# Patient Record
Sex: FEMALE | URBAN - METROPOLITAN AREA
[De-identification: names, ages, dates, MRNs, and addresses within clinical notes are randomized per-mention and may not be internally consistent; named-entity substitution may affect disease eponyms.]

---

## 2024-06-28 ENCOUNTER — HOSPITAL ENCOUNTER (OUTPATIENT)
Dept: TELEMEDICINE | Facility: HOSPITAL | Age: 74
Discharge: HOME | End: 2024-06-28

## 2024-06-28 DIAGNOSIS — R47.81 SLURRED SPEECH: Primary | ICD-10-CM

## 2024-06-28 PROCEDURE — G0425 INPT/ED TELECONSULT30: HCPCS | Mod: GT,,, | Performed by: PSYCHIATRY & NEUROLOGY

## 2024-06-28 NOTE — SUBJECTIVE & OBJECTIVE
HPI:  74 y.o. female with HTN, PAD s/p stenting, brought in due to acute onset L face droop and slurred speech. Never had similar symptoms before.  She was reportedly taking to a friend and was noted to have the aforementioned symptoms.  Improving.      Images personally reviewed and interpreted:  Study: Head CT  Study Interpretation: no acute abnormality     Assessment and plan:  Concern for small acute L cortical infarct.  She seems to have some difficulty with comprehension and motor language output but overall symptoms are mild at this moment, thus will not treat with TN.  Recommended CTA brain and neck now. If no LVO noted, admit to [facility] facility and obtain MRI brain.  DAPT.  Neurology, speech therapy consults.    Lytics recommendation: Thrombolytic therapy not recommended due to Mild Non-Disabling Symptoms  Thrombectomy recommendation: [place] [facility] [place]   Placement recommendation: pending further studies

## 2024-06-28 NOTE — TELEMEDICINE CONSULT
[facility]  Vascular Neurology  Comprehensive Stroke Center  TeleVascular Neurology Acute Consultation Note        Consult Information  Consults    Consulting Provider: LAST BALL JR.   Current Providers  No providers found    Patient Location: [facility] Emergency Department    Spoke hospital nurse at bedside with patient assisting consultant.  Patient information was obtained from patient, relative(s), past medical records, and primary team.       Stroke Documentation  Acute Stroke Times   Last Known Normal Date: 06/28/24  Last Known Normal Time: 1000  Symptom Onset Date: 06/28/24  Symptom Onset Time: 1000  Stroke Team Called Date: 06/21/24  Stroke Team Called Time: 1335  Stroke Team Arrival Date: 06/28/24  Stroke Team Arrival Time: 1340  CT Interpretation Time: 1345  Thrombolytic Therapy Recommended: No  Thrombectomy Recommended:  (Pending CTA)    NIH Scale:  Interval: baseline  1a. Level of Consciousness: 0-->Alert, keenly responsive  1b. LOC Questions: 0-->Answers both questions correctly  1c. LOC Commands: 0-->Performs both tasks correctly  2. Best Gaze: 0-->Normal  3. Visual: 0-->No visual loss  4. Facial Palsy: 1-->Minor paralysis (flattened nasolabial fold, asymmetry on smiling)  5a. Motor Arm, Left: 0-->No drift, limb holds 90 (or 45) degrees for full 10 secs  5b. Motor Arm, Right: 0-->No drift, limb holds 90 (or 45) degrees for full 10 secs  6a. Motor Leg, Left: 0-->No drift, leg holds 30 degree position for full 5 secs  6b. Motor Leg, Right: 0-->No drift, leg holds 30 degree position for full 5 secs  7. Limb Ataxia: 0-->Absent  8. Sensory: 0-->Normal, no sensory loss  9. Best Language: 1-->Mild-to-moderate aphasia, some obvious loss of fluency or facility of comprehension, without significant limitation on ideas expressed or form of expression. Reduction of speech and/or comprehension, however, makes  conversation. . . (see row details)  10. Dysarthria: 0-->Normal  11. Extinction and Inattention (formerly Neglect): 0-->No abnormality  Total (NIH Stroke Scale): 2      Modified Nova: Score: 0  Fela Coma Scale: 14   ABCD2 Score:    [id number] Score:    HAS -BLED Score:    ICH Score:    [employer] Classification:      There were no vitals taken for this visit.    Van Positive  In your opinion, this was a: Tier 1     Medical Decision Making  HPI:  74 y.o. female with HTN, PAD s/p stenting, brought in due to acute onset L face droop and slurred speech. Never had similar symptoms before.  She was reportedly taking to a friend and was noted to have the aforementioned symptoms.  Improving.      Images personally reviewed and interpreted:  Study: Head CT  Study Interpretation: no acute abnormality     Assessment and plan:  Concern for small acute L cortical infarct.  She seems to have some difficulty with comprehension and motor language output but overall symptoms are mild at this moment, thus will not treat with TN.  Recommended CTA brain and neck now. If no LVO noted, admit to spoke facility and obtain MRI brain.  DAPT.  Neurology, speech therapy consults.    Lytics recommendation: Thrombolytic therapy not recommended due to Mild Non-Disabling Symptoms  Thrombectomy recommendation: Awaiting CTA results from [place] for determination   Placement recommendation: pending further studies               ROS  Physical Exam  No past medical history on file.  No past surgical history on file.  No family history on file.    Diagnoses  Problem Noted   Slurred Speech 6/28/2024       Saud Norman MD      Emergent/Acute neurological consultation requested by spoke provider due to critical concerns for possible cerebrovascular event that could result in permanent loss of neurologic/bodily function, severe disability or death of this patient.  Immediate/timely evaluation by a highly prepared expert is paramount for optimal  outcomes  High risk for neurological deterioration if not properly diagnosed  High risk for neurological deterioration if not treated promplty/as soon as possible  Complex diagnostic evaluation may be required (advanced imaging)  High risk treatment options (thrombolytics and/or thrombectomy)    Patient care was coordinated with spoke provider, including but not limted to    Discussing likely diagnosis/etiology of symptoms  Making recommendations for further diagnostic studies  Making recommendations for intravenous thrombolytics or other advanced therapies  Making recommendations for disposition (admission/transfer for higher level of care)